# Patient Record
Sex: FEMALE | Race: WHITE
[De-identification: names, ages, dates, MRNs, and addresses within clinical notes are randomized per-mention and may not be internally consistent; named-entity substitution may affect disease eponyms.]

---

## 2017-09-16 ENCOUNTER — HOSPITAL ENCOUNTER (EMERGENCY)
Dept: HOSPITAL 25 - ED | Age: 32
LOS: 1 days | Discharge: HOME | End: 2017-09-17
Payer: COMMERCIAL

## 2017-09-16 VITALS — SYSTOLIC BLOOD PRESSURE: 107 MMHG | DIASTOLIC BLOOD PRESSURE: 67 MMHG
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
## 2017-09-16 DIAGNOSIS — S82.61XA: Primary | ICD-10-CM
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
## 2017-09-16 PROCEDURE — 99282 EMERGENCY DEPT VISIT SF MDM: CPT
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
## 2017-11-02 ENCOUNTER — HOSPITAL ENCOUNTER (OUTPATIENT)
Dept: HOSPITAL 25 - OR | Age: 32
Discharge: HOME | End: 2017-11-02
Attending: ORTHOPAEDIC SURGERY
Payer: COMMERCIAL

## 2017-11-02 VITALS — DIASTOLIC BLOOD PRESSURE: 70 MMHG | SYSTOLIC BLOOD PRESSURE: 123 MMHG

## 2017-11-02 DIAGNOSIS — E03.9: ICD-10-CM
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
## 2017-11-02 DIAGNOSIS — M25.374: ICD-10-CM
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
## 2017-11-02 DIAGNOSIS — F17.210: ICD-10-CM

## 2017-11-02 DIAGNOSIS — S93.324A: Primary | ICD-10-CM

## 2017-11-02 PROCEDURE — C1713 ANCHOR/SCREW BN/BN,TIS/BN: HCPCS

## 2017-11-02 PROCEDURE — C1776 JOINT DEVICE (IMPLANTABLE): HCPCS

## 2017-11-02 PROCEDURE — 76000 FLUOROSCOPY <1 HR PHYS/QHP: CPT

## 2017-11-02 RX ADMIN — HYDROMORPHONE HYDROCHLORIDE PRN MG: 1 INJECTION, SOLUTION INTRAMUSCULAR; INTRAVENOUS; SUBCUTANEOUS at 16:09

## 2017-11-02 RX ADMIN — HYDROMORPHONE HYDROCHLORIDE PRN MG: 1 INJECTION, SOLUTION INTRAMUSCULAR; INTRAVENOUS; SUBCUTANEOUS at 16:29

## 2017-11-02 RX ADMIN — HYDROMORPHONE HYDROCHLORIDE PRN MG: 1 INJECTION, SOLUTION INTRAMUSCULAR; INTRAVENOUS; SUBCUTANEOUS at 16:02

## 2017-11-02 RX ADMIN — HYDROMORPHONE HYDROCHLORIDE PRN MG: 1 INJECTION, SOLUTION INTRAMUSCULAR; INTRAVENOUS; SUBCUTANEOUS at 15:52

## 2017-11-02 RX ADMIN — HYDROMORPHONE HYDROCHLORIDE PRN MG: 1 INJECTION, SOLUTION INTRAMUSCULAR; INTRAVENOUS; SUBCUTANEOUS at 16:36
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
## 2017-11-10 NOTE — RAD
INDICATION: Right foot, right foot injury and pain



COMPARISONS: MRI dated October 26, 2017



TECHNIQUE: Fluoroscopy was provided for a surgical procedure. Total fluoroscopy time is: 2

minutes, 33 seconds



FINDINGS: Spot images demonstrate internal fixation of the medial midfoot



IMPRESSION: FLUOROSCOPY WAS PROVIDED FOR A SURGICAL PROCEDURE



CPT II Codes: 6045F

## 2018-03-22 ENCOUNTER — HOSPITAL ENCOUNTER (OUTPATIENT)
Dept: HOSPITAL 25 - OR | Age: 33
Discharge: HOME | End: 2018-03-22
Attending: ORTHOPAEDIC SURGERY
Payer: COMMERCIAL

## 2018-03-22 VITALS — SYSTOLIC BLOOD PRESSURE: 112 MMHG | DIASTOLIC BLOOD PRESSURE: 75 MMHG

## 2018-03-22 DIAGNOSIS — S93.324D: ICD-10-CM

## 2018-03-22 DIAGNOSIS — S93.601D: ICD-10-CM
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
## 2018-03-22 DIAGNOSIS — E03.9: ICD-10-CM

## 2018-03-22 DIAGNOSIS — F17.210: ICD-10-CM

## 2018-03-22 DIAGNOSIS — S93.431D: ICD-10-CM

## 2018-03-22 DIAGNOSIS — T84.84XA: Primary | ICD-10-CM

## 2018-03-22 PROCEDURE — 88300 SURGICAL PATH GROSS: CPT

## 2018-03-22 PROCEDURE — 76000 FLUOROSCOPY <1 HR PHYS/QHP: CPT

## 2018-03-22 PROCEDURE — C1776 JOINT DEVICE (IMPLANTABLE): HCPCS

## 2018-03-22 NOTE — OP
Operative Report - Blank





- Operative Report


Date of Operation: 03/22/18


Note: 


PATIENT: Johana Robledo





DATE OF BIRTH: [birth year]





DATE OF SURGERY: 3/22/18 





SURGEON: Mihir Ace MD





ASSISTANT: JACINTA Menchaca, who's assistance was necessary for positioning, 

retraction, help with instrumentation, and closure.





ANESTHESIOLOGIST: Kiko Hidalgo MD





PREOPERATIVE DIAGNOSIS: Right foot painful retained hardware





POSTOPERATIVE DIAGNOSIS: Right foot painful retained hardware





OPERATION:


1. Right foot, removal of implants, deep. 


2. Stress fluoroscopy performed by surgeon under anesthesia.





ANESTHESIA: GETA





IMPLANTS: Synthes mini-frag plate and screws and 4.0 mm cannulated screws 

removed.





TOURNIQUET TIME: Less than 1 hour with an ankle Esmarch tourniquet





SPECIMENS: none





ESTIMATED BLOOD LOSS: minimal





COMPLICATIONS: none





STATUS:


Stable from the operating room to the recovery room and then home.





INDICATIONS FOR PROCEDURE:


Johana previously underwent a right Lisfranc ORIF. Both operative and non 

operative treatment alternatives were reviewed. Further, the nature and risks 

of surgery were reviewed in careful detail, in the office as well as the pre-

operative holding area. Our discussions regarding the risks of surgery included

, but were not limited to, infection, wound problems, nerve injury, neuroma, RSD

, persistent symptoms, blood clot, need for further surgery, post-traumatic 

arthritis, failure of the surgery, and even the remote chance of catastrophic 

complication, including loss of limb.





DESCRIPTION OF PROCEDURE:


The patient was seen in the preoperative holding unit and informed written 

consent was obtained.  The appropriate extremity was marked. The patient was 

then brought to the operating room and carefully positioned on the operating 

room table. Anesthesia was induced. All bony prominences were padded with great 

care. A chlorhexidine based pre-scrub was performed followed by a chloraprep 

prep and drape in standard sterile fashion. A surgical safety pause was then 

conducted in which we confirmed the appropriate patient, extremity, planned 

procedure, availability of equipment, indication and administration of 

prophylactic antibiotics, and DVT prophylaxis in the form of a compression boot 

on the non-surgical extremity. 





We began by placing an ankle Esmarch tourniquet. I utilized the prior dorsal 

foot incision to access the medial column hardware. I used blunt dissection to 

expose the hardware and took great care not to disturb the neurovascular 

bundle. The hardware was exposed and a screw  was used to remove the 

screws. A freer was used to loosen the plate which was then removed. A rongeur 

was used to smooth out the bone. 





I then turned my attention to the percutaneous screw. I utilized a small K wire 

to cannulate the cannulated screw. I dissected down to expose the hardware. We 

removed the screw utilizing a screwdriver without difficulty. 





I then performed an external rotation and abduction stress fluoroscopic 

examination. No instability was appreciated at the Lisfranc articulation. A 

fluoroscopic image was obtained demonstrating removal of hardware.





At this point, we irrigated copiously and then closed in layers meticulously 

utilizing 3-0 Monocryl and 3-0 nylon for the skin.  A sterile dressing was then 

applied.





The patient was then awakened from anesthesia and transferred to the recovery 

room in stable condition.  There were no complications.  All needle and sponge 

counts were correct at the end of the case.





ATTESTATION:


I attest I was present and scrubbed and performed the critical portions of the 

procedure myself.





POSTOPERATIVE PLAN: The plan is to remove the sutures in 2 weeks.

## 2018-03-23 NOTE — RAD
INDICATION:  Right foot removal of hardware.



COMPARISON:  Comparison is made with prior x-ray study of the right foot from February 13, 2018.



TECHNIQUE: 15 seconds of intermittent fluoroscopic guidance were provided and 3 spot films

of the right foot were obtained in the operating room.



FINDINGS:  The films demonstrate interval removal of the metallic plate and surgical

screws in the medial midfoot.



IMPRESSION:  INTRAOPERATIVE CONTROL FILMS. 



CPT II Codes: 6045F

## 2018-03-30 ENCOUNTER — HOSPITAL ENCOUNTER (EMERGENCY)
Dept: HOSPITAL 25 - UCCORT | Age: 33
Discharge: HOME | End: 2018-03-30
Payer: COMMERCIAL

## 2018-03-30 VITALS — SYSTOLIC BLOOD PRESSURE: 135 MMHG | DIASTOLIC BLOOD PRESSURE: 63 MMHG

## 2018-03-30 DIAGNOSIS — Z98.890: ICD-10-CM

## 2018-03-30 DIAGNOSIS — J18.9: Primary | ICD-10-CM

## 2018-03-30 DIAGNOSIS — Z88.5: ICD-10-CM

## 2018-03-30 DIAGNOSIS — F17.210: ICD-10-CM

## 2018-03-30 PROCEDURE — G0463 HOSPITAL OUTPT CLINIC VISIT: HCPCS

## 2018-03-30 PROCEDURE — 71275 CT ANGIOGRAPHY CHEST: CPT

## 2018-03-30 PROCEDURE — 99213 OFFICE O/P EST LOW 20 MIN: CPT

## 2018-03-30 NOTE — UC
Respiratory Complaint HPI





- HPI Summary


HPI Summary: 





33 yo female is about 8 days s/p surgery on right foot (removal of hardware).  

Hasn't been out of bed except much until today





past few days has had a cough associated with dyspnea and profuse diarphoresis 

with minimal activity.  possible low grade temp.





No CP





- History of Current Complaint


Chief Complaint: UCRespiratory


Stated Complaint: COUGH


Time Seen by Provider: 03/30/18 07:18


Hx Last Menstrual Period: 03/09/18


Onset/Duration: Gradual Onset, Lasting Days


Timing: Constant


Severity Initially: Mild


Severity Currently: Moderate


Pain Intensity: 0


Pain Scale Used: 0-10 Numeric


Character: Cough: Productive - scant sputum


Aggravating Factors: Nothing


Alleviating Factors: Nothing


Associated Signs And Symptoms: Positive: Dyspnea





- Risk Factors


Pulmonary Embolism Risk Factors: Recent Bedrest, Recent Surgery





- Allergies/Home Medications


Allergies/Adverse Reactions: 


 Allergies











Allergy/AdvReac Type Severity Reaction Status Date / Time


 


codeine Allergy  Itching Verified 03/30/18 07:09














PMH/Surg Hx/FS Hx/Imm Hx


Previously Healthy: Yes





- Surgical History


Surgical History: Yes


Surgery Procedure, Year, and Place: c section x 2 - [place] 2006; [place] 2010.

  cholecystectomy 2002 [place].  gall bladder duct stent removed 2008- 

[facility].  TUBAL LIGATION,OVARY AND FALLOPIAN TUBE REMOVED 2016 [place].  right 

foot orif - 2017 [facility].  Right ankle hardware removal 2018





- Family History


Known Family History: Positive: Hypertension, Other - mother FMH ear infections 

as adult





- Social History


Alcohol Use: Occasionally


Substance Use Type: None


Smoking Status (MU): Light Every Day Tobacco Smoker


Type: Cigarettes


Amount Used/How Often: 1/2 ppd for 10 yrs


Length of Time of Smoking/Using Tobacco: on/off 12 yrs


Have You Smoked in the Last Year: Yes


When Did the Patient Quit Smoking/Using Tobacco: Nov 2013





- Immunization History


Most Recent Influenza Vaccination: 2013





Review of Systems


Constitutional: Negative


Skin: Negative


Eyes: Negative


ENT: Negative


Respiratory: Shortness Of Breath, Cough


Cardiovascular: Negative


Gastrointestinal: Negative


Genitourinary: Negative


Motor: Negative


Neurovascular: Negative


Musculoskeletal: Other: - varicosities left LE no calf pain or swelling


Neurological: Negative


Psychological: Negative


Is Patient Immunocompromised?: No


All Other Systems Reviewed And Are Negative: Yes





Physical Exam


Triage Information Reviewed: Yes


Appearance: Well-Appearing, No Pain Distress, Well-Nourished


Vital Signs: 


 Initial Vital Signs











Temp  98.3 F   03/30/18 07:07


 


Pulse  89   03/30/18 07:07


 


Resp  17   03/30/18 07:07


 


BP  117/67   03/30/18 07:07


 


Pulse Ox  99   03/30/18 07:07











Vital Signs Reviewed: Yes


Eyes: Positive: Conjunctiva Clear


ENT: Positive: Hearing grossly normal.  Negative: Nasal congestion, Nasal 

drainage, Dental tenderness, Sinus tenderness


Neck: Positive: Supple, Nontender, No Lymphadenopathy


Respiratory: Positive: Lungs clear, Normal breath sounds, No respiratory 

distress, No accessory muscle use


Cardiovascular: Positive: RRR, No Murmur


Musculoskeletal: Positive: No Edema, Other: - right foot in ace wrap and post 

op shoe


Neurological Exam: Normal


Psychological Exam: Normal


Skin Exam: Normal





[facility] Diagnostic Evaluation





- Laboratory


O2 Sat by Pulse Oximetry: 99 - normal/not hypoxic





- Radiology


Xray Interpretation: Positive (See Comments) - 1. NO PULMONARY ARTERIAL FILLING 

DEFECT TO SUGGEST PULMONARY EMBOLISM.


Radiology Interpretation Completed By: Radiologist





Re-Evaluation





- Re-Evaluation


  ** First Eval


Re-Evaluation Time: 10:08


Change: Improved - subjectively improved





Respiratory Course/Dx





- Differential Dx/Diagnosis


Provider Diagnoses: pneumonia





Discharge





- Sign-Out/Discharge


Documenting (check all that apply): Discharge





- Discharge Plan


Condition: Stable


Disposition: HOME


Prescriptions: 


Amoxicillin/Clavulanate TAB* [Augmentin *] 875 mg PO BID #14 tab


Fluconazole 150 MG (NF) [Diflucan 150 mg (NF)] 150 mg PO ONCE #1 tab


Patient Education Materials:  Pneumonia (ED)


Referrals: 


Courtney Phillips PA [Primary Care Provider] - 5 Days


Additional Instructions: 


recheck for new or worsening symptoms





use inhaler as directed





robitussin or mucinex





- Billing Disposition and Condition


Condition: STABLE


Disposition: HOME

## 2018-03-30 NOTE — RAD
HISTORY: Shortness of breath, status post surgery



COMPARISONS: None



TECHNIQUE: Multiple contiguous axial CT scans of the chest were obtained after the

administration of nonionic intravenous contrast, timed to the pulmonary arterial phase of

contrast enhancement.. Coronal and sagittal multiplanar reformations are also submitted

for review.        



FINDINGS:    



NECK AND THYROID: The lower neck and thyroid are unremarkable.

CHEST WALL: There is no lower cervical, axillary, or supraclavicular lymphadenopathy by

size criteria.



HEART AND PERICARDIUM: The heart is unremarkable.

AORTA AND PULMONARY VASCULATURE: There is no pulmonary arterial filling defect to suggest

pulmonary embolism. There is no linear filling defect within the aorta to suggest aortic

dissection.



MEDIASTINUM: There is no mediastinal lymphadenopathy by size criteria.

DEYSI: There is no hilar lymphadenopathy by size criteria.



AIRWAY AND ESOPHAGUS: The airway is unremarkable, without endobronchial filling defect.

The esophagus is grossly normal.

LUNG PARENCHYMA: There is patchy groundglass opacification of the left lower lobe.

PLEURA: No pleural abnormalities are noted.



UPPER ABDOMEN: The upper abdomen is unremarkable.

BONES AND SOFT TISSUES: No bone or soft tissue abnormalities are noted.



OTHER: None.



IMPRESSION: 

1.  NO PULMONARY ARTERIAL FILLING DEFECT TO SUGGEST PULMONARY EMBOLISM.

2.  THERE IS MINIMAL AIRSPACE DISEASE OF THE LEFT LOWER LOBE.

## 2019-09-14 ENCOUNTER — HOSPITAL ENCOUNTER (EMERGENCY)
Dept: HOSPITAL 25 - UCCORT | Age: 34
Discharge: HOME | End: 2019-09-14
Payer: COMMERCIAL

## 2019-09-14 VITALS — DIASTOLIC BLOOD PRESSURE: 66 MMHG | SYSTOLIC BLOOD PRESSURE: 134 MMHG

## 2019-09-14 DIAGNOSIS — J40: Primary | ICD-10-CM

## 2019-09-14 DIAGNOSIS — F17.210: ICD-10-CM

## 2019-09-14 DIAGNOSIS — Z88.5: ICD-10-CM

## 2019-09-14 PROCEDURE — 99212 OFFICE O/P EST SF 10 MIN: CPT

## 2019-09-14 PROCEDURE — G0463 HOSPITAL OUTPT CLINIC VISIT: HCPCS

## 2019-09-14 NOTE — XMS REPORT
Continuity of Care Document (CCD)

 Created on:2019



Patient:Johana Robledo

Sex:Female

:1985

External Reference #:[id number]





Demographics







 Address  [address] [place] [id number]

 

 Mobile Phone  [phone]

 

 Email Address  [email or web address]

 

 Preferred Language  en

 

 Marital Status  Declined to Specify/Unknown

 

 Restorationism Affiliation  Unknown

 

 Race  White

 

 Ethnic Group  Declined to Specify/Unknown









Author







 Name  JACINTA Hercules (transmitted by agent of provider Joe Nelson)

 

 Address  [address] [place] [id number]









Care Team Providers







 Name  Role  Phone

 

 Tru Ortega MD - Internal  Care Team Information   [phone]



 Medicine    

 

 Jaison Tanner MD - Otolaryngology  Care Team Information   [phone]
[phone]

 

 Miguel Orellana MD, PhD -  Care Team Information   [phone]
[phone]



 Neurology    

 

 Gómez Hoffmann MD - Orthopaedic  Care Team Information   [phone]
[phone]



 Surgery    

 

 Mihir Ace MD - Orthopaedic  Care Team Information   [phone]
[phone]



 Surgery    

 

 [facility]  Care Team Information   [phone]
[phone]









Problems







 Active Problems  Provider  Date

 

 Obesity    Onset: 2018

 

 Otalgia    Onset: 2018

 

 Varicose veins of lower extremity    Onset: 2018

 

 Tobacco user    Onset: 2018

 

 Dizziness and giddiness    Onset: 2018

 

 Closed fracture dislocation of tarsometatarsal joint    Onset: 2018

 

 Hypothyroidism    Onset: 2018

 

 Acute lymphadenitis    Onset: 2018

 

 Multiple joint pain    Onset: 2014

 

 Gynecologic examination    Onset: 2014

 

 Needs influenza immunization    Onset: 2014

 

 Herpes simplex  JACINTA Hercules  Onset: 2019







Social History







 Type  Date  Description  Comments

 

 Birth Sex    Unknown  

 

 ETOH Use    Occasionally consumes alcohol  

 

 Tobacco Use  Start: Unknown  Light tobacco smoker (10 or fewer  



     cigarettes/day)  

 

 Recreational Drug Use    Never Used Drugs  

 

 Tobacco Use  Start: Unknown  smoke free  for 30 days  

 

 Tobacco Use  Start: Unknown  pt has quit smoking for 2 months  

 

 Smoking Status  Reviewed: 19  pt has quit smoking for 2 months  

 

 Exercise Type/Frequency    Exercises sporadically  







Allergies, Adverse Reactions, Alerts







 Active Allergies  Reaction  Severity  Comments  Date

 

 Codeine        2019







Medications







 Active Medications  SIG  Qnty  Indications  Ordering  Date



         Provider  

 

 Famciclovir  3 tablets at once  6tabs  B00.89  Quinn  2019



          500mg Tablets  at onset of cold      MD Mara  



   sores        

 

 Phentermine HCL  one daily before  30caps  E66.9  Quinn  2017



              37.5mg  breakfast as      MD Mara  



 Capsules  directed        



           

 

 Levothyroxine Sodium  Take One Tablet  90tabs  E03.9  Quinn  03/10/2016



   By Mouth Every      MD Mara  



 25mcg Tablets  Day        



           









 History Medications









 Nitrofurantoin Monohyd  1 by mouth  20caps  R30.0  Quinn Terry  2019 -



 Macro  twice a day      MD  2019



    100mg Capsules          



           

 

 Fluconazole  1 by mouth  2tabs  N76.0  Quinn Terry  2019 -



          150mg Tablets  every day      MD  2019



           

 

 Azithromycin  2 pills on  6tabs  J01.90  Quinn Terry  2019 -



           250mg  day one and      MD  2019



 Tablets  then one pill        



   daily        







Immunizations







 CPT Code  Status  Date  Vaccine  Lot #

 

 [id number]  Given  2014  Influenza Virus 3Yrs & Over  

 

 43329  Given  10/31/2012  Influenza Virus 3Yrs & Over  

 

 34485  Given  2000  Tetanus Toxoid Adsorbed, For Intramuscular Use  

 

 68603  Given  1986  Poliovirus Vaccine OPV Live Oral Use  

 

 15167  Given  1986  DTP Vaccine  

 

 35421  Given  05/10/1986  Measles Mumps And Rubella MMR  

 

 39269  Given  1985  DTP Vaccine  

 

 96391  Given  1985  Poliovirus Vaccine OPV Live Oral Use  

 

 38638  Given  1985  DTP Vaccine  

 

 28346  Given  1985  Poliovirus Vaccine OPV Live Oral Use  

 

 65410  Given  1985  DTP Vaccine  







Vital Signs







 Date  Vital  Result  Comment

 

 2019  4:20pm  Weight  213.25 lb  









 BP Systolic Sitting  110 mmHg  

 

 BP Diastolic Sitting  50 mmHg  









 2019  4:13pm  Weight  214.56 lb  









 BP Systolic Sitting  112 mmHg  

 

 BP Diastolic Sitting  60 mmHg  







Results







 Test  Date  Facility  Test  Result  H/L  Range  Note

 

 Ua Routine  2019  [facility]  Ua Specific Gravity  1.005      









 Ua PH  6.0      

 

 Ua Color  yellow      

 

 Ua Appera  cloudy      

 

 Ua WBC  3+      

 

 Ua Protein  neg      

 

 Ua Glucose  neg      

 

 Ua Ketones  pos      

 

 Ua Bilirubin  neg      

 

 Ua Urobilinogen  neg      

 

 Ua Nitrite  pos      

 

 Ua Occult Blood  neg      







Procedures







 Description

 

 No Information Available







Medical Devices







 Description

 

 No Information Available







Encounters







 Type  Date  Location  Provider  Dx  Diagnosis

 

 Office Visit  2019  4:00p  [facility]  JACINTA Hercules  E66.8  Other 
obesity

 

 Office Visit  2019  4:00p  [facility]  JACINTA Hercules  E66.8  Other 
obesity









 N93.8  Other specified abnormal uterine and vaginal bleeding









 Office Visit  2019  3:30p  [facility]  JACINTA Hercules  J06.9  Acute upper



     Care      respiratory



           infection,



           unspecified









 E66.8  Other obesity









 Office Visit  2019 10:00a  [facility]  JACINTA Hercules  R30.0  
Dysuria









 N76.0  Acute vaginitis









 Office Visit  2019  4:15p  [facility]  JACINTA Hercules  E66.8  Other 
obesity









 Z72.0  Tobacco use

 

 J06.9  Acute upper respiratory infection, unspecified

 

 B00.89  Other herpesviral infection









 Office Visit  2019  4:30p  [facility]  JACINTA Hercules  J06.9  Acute upper



     Care      respiratory



           infection,



           unspecified









 J01.90  Acute sinusitis, unspecified

 

 E66.8  Other obesity







Assessments







 Date  Code  Description  Provider

 

 2019  E66.8  Other obesity  JACINTA Hercules

 

 2019  E66.8  Other obesity  JACINTA Hercules

 

 2019  E66.8  Other obesity  JACINTA Hercules

 

 2019  N93.8  Other specified abnormal uterine and vaginal bleeding  JACINTA Hercules

 

 2019  J06.9  Acute upper respiratory infection, unspecified  JACINTA Hercules

 

 2019  E66.8  Other obesity  JACINTA Hercules

 

 2019  R30.0  Dysuria  JACINTA Hercules

 

 2019  N76.0  Acute vaginitis  JACINTA Hercules

 

 2019  E66.8  Other obesity  JACINTA Hercules

 

 2019  Z72.0  Tobacco use  JACINTA Hercules

 

 2019  J06.9  Acute upper respiratory infection, unspecified  JACINTA Hercules

 

 2019  B00.89  Other herpesviral infection  JACINTA Hercules

 

 2019  J06.9  Acute upper respiratory infection, unspecified  JACINTA Hercules

 

 2019  J01.90  Acute sinusitis, unspecified  JACINTA Hercules

 

 2019  E66.8  Other obesity  JACINTA Hercules







Plan of Treatment

Future Appointment(s):2019  4:15 pm - JACINTA Hercules at [facility] - VIKTOR Hercules66.8 Other obesity



Functional Status







 Functional Condition  Comment  Date  Status

 

 Soft Contacts      Active







Mental Status







 Description

 

 No Information Available







Referrals







 Description

 

 No Information Available

## 2019-09-14 NOTE — UC
Respiratory Complaint HPI





- HPI Summary


HPI Summary: 





Per RN triage:


"Pt states she started to cough 8/28. Pt states it is a dry cough and yesterday 

was short of breath upon exertion. "


-had sick contacts at that time. 


-no fevers/chills


-no ST/ear pain. 


+ wheezing. no asthma. no Fhx asthma except from dtr (comes from husbands side)








- History of Current Complaint


Chief Complaint: UCGeneralIllness


Stated Complaint: COUGH


Time Seen by Provider: 09/14/19 07:35


Hx Last Menstrual Period: 8/19/19


Pain Intensity: 0





- Allergies/Home Medications


Allergies/Adverse Reactions: 


 Allergies











Allergy/AdvReac Type Severity Reaction Status Date / Time


 


codeine Allergy  Itching Verified 09/14/19 07:32














PMH/Surg Hx/FS Hx/Imm Hx


Previously Healthy: Yes


Endocrine History: Thyroid Disease





- Surgical History


Surgical History: Yes


Surgery Procedure, Year, and Place: c section x 2 - [place] 2006; [place] 2010.

  cholecystectomy 2002 [place].  gall bladder duct stent removed 2008- 

[facility].  TUBAL LIGATION,OVARY AND FALLOPIAN TUBE REMOVED 2016 [place].  right 

foot orif - 2017 [facility].  Right ankle hardware removal 2018





- Family History


Known Family History: Positive: Hypertension, Other - mother FMH ear infections 

as adult


   Negative: Respiratory Disease - no asthma





- Social History


Alcohol Use: Occasionally


Substance Use Type: None


Smoking Status (MU): Light Every Day Tobacco Smoker


Type: Cigarettes


Amount Used/How Often: 1/2 ppd for 10 yrs


Length of Time of Smoking/Using Tobacco: on/off 12 yrs


Have You Smoked in the Last Year: Yes


When Did the Patient Quit Smoking/Using Tobacco: 3/2019





- Immunization History


Most Recent Influenza Vaccination: 2013





Review of Systems


All Other Systems Reviewed And Are Negative: Yes


Constitutional: Positive: Fatigue.  Negative: Fever, Chills


Skin: Positive: Negative.  Negative: Rash


Eyes: Positive: Negative


ENT: Positive: Negative.  Negative: Ear Ache, Nasal Discharge, Sinus Congestion

, Sinus Pain/Tenderness


Respiratory: Positive: Shortness Of Breath - w/ signifcant cough.  Negative: 

Cough


Cardiovascular: Positive: Negative.  Negative: Palpitations, Chest Pain


Gastrointestinal: Positive: Negative.  Negative: Vomiting, Nausea


Genitourinary: Positive: Negative.  Negative: Dysuria


Motor: Positive: Negative


Neurovascular: Positive: Negative


Musculoskeletal: Positive: Negative


Neurological: Positive: Negative


Psychological: Positive: Negative


Is Patient Immunocompromised?: No





Physical Exam


Triage Information Reviewed: Yes


Appearance: Well-Appearing, No Pain Distress, Well-Nourished - mod cough, gets 

into coughing fits


Vital Signs: 


 Initial Vital Signs











Temp  98.2 F   09/14/19 07:28


 


Pulse  80   09/14/19 07:28


 


Resp  16   09/14/19 07:28


 


BP  134/66   09/14/19 07:28


 


Pulse Ox  98   09/14/19 07:28











Vital Signs Reviewed: Yes


Eye Exam: Normal


Eyes: Positive: Conjunctiva Clear


ENT Exam: Normal


ENT: Positive: Pharynx normal, TMs normal, Uvula midline.  Negative: Nasal 

congestion, Nasal drainage, TM bulging, TM dull, TM red, Sinus tenderness


Dental Exam: Normal


Neck exam: Normal


Neck: Positive: Supple, Nontender, No Lymphadenopathy


Respiratory: Positive: No respiratory distress, Decreased breath sounds.  

Negative: No accessory muscle use, Respiratory distress, Crackles, Rhonchi, 

Stridor, Wheezing


Cardiovascular Exam: Normal


Cardiovascular: Positive: RRR


Abdomen Description: Positive: Nontender, Soft


Musculoskeletal Exam: Normal


Neurological Exam: Normal


Psychological Exam: Normal


Skin Exam: Normal





Re-Evaluation





- Re-Evaluation


  ** First Eval


Re-Evaluation Time: 08:30


Change: Improved - improved breath sounds. no w/r/r. feels much better. can 

take deep breaths now w/o being SOB





Respiratory Course/Dx





- Course


Course Of Treatment: 





Albuterol neb with good improvement. she has nebulizer at home for her kids 

that she would like to use. 








-explained medication side effects





- Differential Dx/Diagnosis


Differential Diagnosis/HQI/PQRI: Asthma, Bronchitis


Provider Diagnosis: 


 Bronchitis








Discharge ED





- Sign-Out/Discharge


Documenting (check all that apply): Patient Departure


All imaging exams completed and their final reports reviewed: No Studies





- Discharge Plan


Condition: Stable


Disposition: HOME


Prescriptions: 


Albuterol 2.5MG/3ML (0.083%)* [Ventolin 2.5 MG/3 ML NEB.SOL*] 2.5 mg INH Q4H #1 

neb.sol


Albuterol HFA INHALER* [Ventolin HFA Inhaler*] 2 puff INH Q4H PRN 14 Days #1 mdi


 PRN Reason: Cough


methylPREDNISolone [Medrol Dosepak 4 MG*] 4 mg PO DAILY #1 evi


Patient Education Materials:  Acute Bronchitis (ED)


Referrals: 


Courtney Phillips PA [Primary Care Provider] - 5 Days


Additional Instructions: 


Make sure to increase fluids and rest. 


-Use the albuterol every 4-6 hrs for cough and shortness of breath


-You should follow up sooner if your symptoms increase or persist. or if you 

develop fevers/chills/worsening shortness of breath. 


--We talked about the potential side effects of prednisone including but not 

limited too increased energy/decreased sleep, stomach upset, irritability, 

hunger, elevated blood sugars and blood pressures, problems with your adrenal 

glands and cut off of the blood supply going to your hip. The latter symptoms 

are more typical of long term or frequent use of steroids.  





- Billing Disposition and Condition


Condition: STABLE


Disposition: Home